# Patient Record
Sex: MALE | Race: WHITE | Employment: FULL TIME | URBAN - METROPOLITAN AREA
[De-identification: names, ages, dates, MRNs, and addresses within clinical notes are randomized per-mention and may not be internally consistent; named-entity substitution may affect disease eponyms.]

---

## 2023-09-20 ENCOUNTER — APPOINTMENT (OUTPATIENT)
Dept: GENERAL RADIOLOGY | Age: 28
End: 2023-09-20
Payer: COMMERCIAL

## 2023-09-20 ENCOUNTER — HOSPITAL ENCOUNTER (INPATIENT)
Age: 28
LOS: 2 days | Discharge: HOME | End: 2023-09-22
Attending: EMERGENCY MEDICINE | Admitting: STUDENT IN AN ORGANIZED HEALTH CARE EDUCATION/TRAINING PROGRAM
Payer: COMMERCIAL

## 2023-09-20 DIAGNOSIS — S72.001A CLOSED RIGHT HIP FRACTURE, INITIAL ENCOUNTER (HCC): Primary | ICD-10-CM

## 2023-09-20 LAB
ANION GAP SERPL CALC-SCNC: 5 MMOL/L (ref 2–11)
BASOPHILS # BLD: 0.1 K/UL (ref 0–0.2)
BASOPHILS NFR BLD: 1 % (ref 0–2)
BUN SERPL-MCNC: 15 MG/DL (ref 6–23)
CALCIUM SERPL-MCNC: 8.7 MG/DL (ref 8.3–10.4)
CHLORIDE SERPL-SCNC: 107 MMOL/L (ref 101–110)
CO2 SERPL-SCNC: 28 MMOL/L (ref 21–32)
CREAT SERPL-MCNC: 0.8 MG/DL (ref 0.8–1.5)
CRP SERPL-MCNC: <0.3 MG/DL (ref 0–0.9)
DIFFERENTIAL METHOD BLD: ABNORMAL
EOSINOPHIL # BLD: 0.3 K/UL (ref 0–0.8)
EOSINOPHIL NFR BLD: 3 % (ref 0.5–7.8)
ERYTHROCYTE [DISTWIDTH] IN BLOOD BY AUTOMATED COUNT: 12.4 % (ref 11.9–14.6)
ERYTHROCYTE [SEDIMENTATION RATE] IN BLOOD: 10 MM/HR (ref 0–20)
GLUCOSE SERPL-MCNC: 118 MG/DL (ref 65–100)
HCT VFR BLD AUTO: 42.7 % (ref 41.1–50.3)
HGB BLD-MCNC: 13.8 G/DL (ref 13.6–17.2)
IMM GRANULOCYTES # BLD AUTO: 0.1 K/UL (ref 0–0.5)
IMM GRANULOCYTES NFR BLD AUTO: 1 % (ref 0–5)
LACTATE SERPL-SCNC: 1.5 MMOL/L (ref 0.4–2)
LYMPHOCYTES # BLD: 2.8 K/UL (ref 0.5–4.6)
LYMPHOCYTES NFR BLD: 23 % (ref 13–44)
MCH RBC QN AUTO: 30.4 PG (ref 26.1–32.9)
MCHC RBC AUTO-ENTMCNC: 32.3 G/DL (ref 31.4–35)
MCV RBC AUTO: 94.1 FL (ref 82–102)
MONOCYTES # BLD: 0.8 K/UL (ref 0.1–1.3)
MONOCYTES NFR BLD: 6 % (ref 4–12)
NEUTS SEG # BLD: 8 K/UL (ref 1.7–8.2)
NEUTS SEG NFR BLD: 66 % (ref 43–78)
NRBC # BLD: 0 K/UL (ref 0–0.2)
PLATELET # BLD AUTO: 394 K/UL (ref 150–450)
PMV BLD AUTO: 10.4 FL (ref 9.4–12.3)
POTASSIUM SERPL-SCNC: 3.4 MMOL/L (ref 3.5–5.1)
PROCALCITONIN SERPL-MCNC: <0.05 NG/ML (ref 0–0.49)
RBC # BLD AUTO: 4.54 M/UL (ref 4.23–5.6)
SODIUM SERPL-SCNC: 140 MMOL/L (ref 133–143)
WBC # BLD AUTO: 12 K/UL (ref 4.3–11.1)

## 2023-09-20 PROCEDURE — 6370000000 HC RX 637 (ALT 250 FOR IP): Performed by: STUDENT IN AN ORGANIZED HEALTH CARE EDUCATION/TRAINING PROGRAM

## 2023-09-20 PROCEDURE — 83605 ASSAY OF LACTIC ACID: CPT

## 2023-09-20 PROCEDURE — 84145 PROCALCITONIN (PCT): CPT

## 2023-09-20 PROCEDURE — 1100000000 HC RM PRIVATE

## 2023-09-20 PROCEDURE — 80048 BASIC METABOLIC PNL TOTAL CA: CPT

## 2023-09-20 PROCEDURE — 86140 C-REACTIVE PROTEIN: CPT

## 2023-09-20 PROCEDURE — 85025 COMPLETE CBC W/AUTO DIFF WBC: CPT

## 2023-09-20 PROCEDURE — 85652 RBC SED RATE AUTOMATED: CPT

## 2023-09-20 PROCEDURE — 96374 THER/PROPH/DIAG INJ IV PUSH: CPT

## 2023-09-20 PROCEDURE — 6360000002 HC RX W HCPCS: Performed by: EMERGENCY MEDICINE

## 2023-09-20 PROCEDURE — 73502 X-RAY EXAM HIP UNI 2-3 VIEWS: CPT

## 2023-09-20 PROCEDURE — 2580000003 HC RX 258: Performed by: STUDENT IN AN ORGANIZED HEALTH CARE EDUCATION/TRAINING PROGRAM

## 2023-09-20 PROCEDURE — 99285 EMERGENCY DEPT VISIT HI MDM: CPT

## 2023-09-20 RX ORDER — ONDANSETRON 2 MG/ML
4 INJECTION INTRAMUSCULAR; INTRAVENOUS EVERY 6 HOURS PRN
Status: DISCONTINUED | OUTPATIENT
Start: 2023-09-20 | End: 2023-09-21 | Stop reason: SDUPTHER

## 2023-09-20 RX ORDER — ACETAMINOPHEN 650 MG/1
650 SUPPOSITORY RECTAL EVERY 6 HOURS PRN
Status: DISCONTINUED | OUTPATIENT
Start: 2023-09-20 | End: 2023-09-22 | Stop reason: HOSPADM

## 2023-09-20 RX ORDER — SODIUM CHLORIDE 0.9 % (FLUSH) 0.9 %
5-40 SYRINGE (ML) INJECTION EVERY 12 HOURS SCHEDULED
Status: DISCONTINUED | OUTPATIENT
Start: 2023-09-20 | End: 2023-09-22 | Stop reason: HOSPADM

## 2023-09-20 RX ORDER — ONDANSETRON 4 MG/1
4 TABLET, ORALLY DISINTEGRATING ORAL EVERY 8 HOURS PRN
Status: DISCONTINUED | OUTPATIENT
Start: 2023-09-20 | End: 2023-09-21 | Stop reason: SDUPTHER

## 2023-09-20 RX ORDER — SODIUM CHLORIDE 9 MG/ML
INJECTION, SOLUTION INTRAVENOUS CONTINUOUS
Status: DISCONTINUED | OUTPATIENT
Start: 2023-09-20 | End: 2023-09-22 | Stop reason: HOSPADM

## 2023-09-20 RX ORDER — SODIUM CHLORIDE 9 MG/ML
INJECTION, SOLUTION INTRAVENOUS PRN
Status: DISCONTINUED | OUTPATIENT
Start: 2023-09-20 | End: 2023-09-22 | Stop reason: HOSPADM

## 2023-09-20 RX ORDER — MORPHINE SULFATE 2 MG/ML
2 INJECTION, SOLUTION INTRAMUSCULAR; INTRAVENOUS EVERY 4 HOURS PRN
Status: DISCONTINUED | OUTPATIENT
Start: 2023-09-20 | End: 2023-09-22 | Stop reason: HOSPADM

## 2023-09-20 RX ORDER — ACETAMINOPHEN 325 MG/1
650 TABLET ORAL EVERY 6 HOURS PRN
Status: DISCONTINUED | OUTPATIENT
Start: 2023-09-20 | End: 2023-09-22 | Stop reason: HOSPADM

## 2023-09-20 RX ORDER — KETOROLAC TROMETHAMINE 30 MG/ML
30 INJECTION, SOLUTION INTRAMUSCULAR; INTRAVENOUS ONCE
Status: DISCONTINUED | OUTPATIENT
Start: 2023-09-20 | End: 2023-09-20

## 2023-09-20 RX ORDER — SODIUM CHLORIDE 0.9 % (FLUSH) 0.9 %
5-40 SYRINGE (ML) INJECTION PRN
Status: DISCONTINUED | OUTPATIENT
Start: 2023-09-20 | End: 2023-09-22 | Stop reason: HOSPADM

## 2023-09-20 RX ORDER — KETOROLAC TROMETHAMINE 30 MG/ML
15 INJECTION, SOLUTION INTRAMUSCULAR; INTRAVENOUS ONCE
Status: COMPLETED | OUTPATIENT
Start: 2023-09-20 | End: 2023-09-20

## 2023-09-20 RX ORDER — POLYETHYLENE GLYCOL 3350 17 G/17G
17 POWDER, FOR SOLUTION ORAL DAILY PRN
Status: DISCONTINUED | OUTPATIENT
Start: 2023-09-20 | End: 2023-09-22 | Stop reason: HOSPADM

## 2023-09-20 RX ADMIN — POTASSIUM BICARBONATE 20 MEQ: 782 TABLET, EFFERVESCENT ORAL at 22:23

## 2023-09-20 RX ADMIN — SODIUM CHLORIDE, PRESERVATIVE FREE 10 ML: 5 INJECTION INTRAVENOUS at 23:14

## 2023-09-20 RX ADMIN — SODIUM CHLORIDE: 9 INJECTION, SOLUTION INTRAVENOUS at 23:18

## 2023-09-20 RX ADMIN — KETOROLAC TROMETHAMINE 15 MG: 30 INJECTION, SOLUTION INTRAMUSCULAR; INTRAVENOUS at 18:06

## 2023-09-20 ASSESSMENT — LIFESTYLE VARIABLES
HOW OFTEN DO YOU HAVE A DRINK CONTAINING ALCOHOL: NEVER
HOW MANY STANDARD DRINKS CONTAINING ALCOHOL DO YOU HAVE ON A TYPICAL DAY: PATIENT DOES NOT DRINK

## 2023-09-20 ASSESSMENT — ENCOUNTER SYMPTOMS
COLOR CHANGE: 0
COUGH: 0
SHORTNESS OF BREATH: 0

## 2023-09-20 ASSESSMENT — PAIN SCALES - GENERAL
PAINLEVEL_OUTOF10: 0

## 2023-09-20 ASSESSMENT — PAIN - FUNCTIONAL ASSESSMENT: PAIN_FUNCTIONAL_ASSESSMENT: 0-10

## 2023-09-20 NOTE — H&P
Hospitalist History and Physical   Admit Date:  2023  4:57 PM   Name:  Brannon Iverson   Age:  33 y.o. Sex:  male  :  1995   MRN:  [id number]   Room:  [place] [address]    Presenting/Chief Complaint: Hip Pain     Reason(s) for Admission: Closed right hip fracture, initial encounter [facility]) [S72.001A]     History of Present Illness:     Mr. Brannon Iverson is a 33 y.o. male with no significant medical history who presented with progressively worsening right hip pain over the last 10 days. He noted intermittent moderate right hip pain made worse running 10 days ago which improved after toke 2 days of rest. The pain came back again when we went back to work and started lifting heavy objects,. Patient has been running 10-15 miles daily 5x/week for the past 2-3 months. He denies falls/trauma,redness/warmth/swelling on the right hip,other joint pain/swelling,urethral discharge,skin rash,fever,chills,weight loss. Workup showed normal inflammatory markers(ESR & CRP), WBC =12, Hip x-ray showed:Nondisplaced right femoral neck fracture. Orthopedics consulted and patient admitted for surgical management in the morning. Assessment & Plan:   Mr. Brannon Iverson is a 33 y.o. male with no significant medical history who presented with progressively worsening right hip pain over the last 10 days and found to have non-displaced right femoral neck fracture. Principal Problem:    Closed right hip fracture, initial encounter ([address])  Hip x-ray showed:Nondisplaced right femoral neck fracture. Likely from repetitive stress injuries , no evidence of infectious/inflammatory process with normal inflammatory markers. Non-weight bearing on the right leg, pain control  Orthopedics consult. PT/OT evals and PPD needed/ordered? No.  Diet: Diet NPO  ADULT DIET; Regular; Low Fat/Low Chol/High Fiber/PARIS  VTE prophylaxis: SCDs pending surgery.   Code status: Full Code      Non-peripheral Lines and Tubes (if Collection Time: 09/20/23  5:10 PM   Result Value Ref Range    CRP <0.3 0.0 - 0.9 mg/dL   Sedimentation Rate    Collection Time: 09/20/23  5:10 PM   Result Value Ref Range    Sed Rate, Automated 10 0 - 20 mm/hr   Lactic Acid    Collection Time: 09/20/23  5:10 PM   Result Value Ref Range    Lactic Acid, Plasma 1.5 0.4 - 2.0 MMOL/L   Procalcitonin    Collection Time: 09/20/23  5:10 PM   Result Value Ref Range    Procalcitonin <0.05 0.00 - 0.49 ng/mL       I have personally reviewed imaging studies:  XR HIP RIGHT (2-3 VIEWS)    Result Date: 9/20/2023  EXAM:XR HIP RIGHT (2-3 VIEWS) DATE: 9/20/2023 3:48 PM HISTORY: Pain after fall. COMPARISON:None FINDINGS: Nondisplaced right femoral neck fracture. Hip joint spaces are maintained. Subcentimeter sclerotic density in the right acetabulum is probably a bone island. Nondisplaced right femoral neck fracture. Thank you for the referral of this patient. This exam was interpreted by an American Board of Radiology certified radiologist with subspecialty fellowship training in [address] [place] [address]. If there are any questions regarding this exam please feel free to contact a radiologist directly at [phone]. Heidy Manning MD 9/20/2023 5:49:00 PM        Signed:  Maykel Jimenez MD    Part of this note may have been written by using a voice dictation software. The note has been proof read but may still contain some grammatical/other typographical errors.

## 2023-09-20 NOTE — ED PROVIDER NOTES
Emergency Department Provider Note       PCP: No primary care provider on file. Age: 33 y.o. Sex: male     DISPOSITION       No diagnosis found. Medical Decision Making     I will get an x-ray to rule out occult hip fracture. I will check his basic blood work including a sed rate and CRP. Complexity of Problems Addressed:  1 or more acute illnesses that pose a threat to life or bodily function. Data Reviewed and Analyzed:  I independently ordered and reviewed each unique test.         I interpreted the X-rays x-ray reveals a nondisplaced hip fracture. .    Discussion of management or test interpretation. Discussed the fact that this is likely surgical with the patient. The management of this patient was discussed with an external consultant. I spoke with on-call for orthopedics. Risk of Complications and/or Morbidity of Patient Management:  Shared medical decision making was utilized in creating the patients health plan today. History       41-year-old gentleman presents with concerns about pain in his right hip. He says it started a week ago after running. He said that initially after about 2 or 3 days the pain got better but then this past Sunday it became far more severe and now he cannot bear weight on it. He denies any direct trauma or injury    No prior history of hip issues. He went to an urgent care and they were concerned about a possible infection so they sent him here for further evaluation    He denies any redness or fevers. No swelling    Elements of this note were created using speech recognition software. As such, errors of speech recognition may be present. Review of Systems   Constitutional:  Negative for chills and fever. Respiratory:  Negative for cough and shortness of breath. Musculoskeletal:  Positive for arthralgias and gait problem. Skin:  Negative for color change and wound.        Physical Exam     Vitals signs and nursing note interpreted by an   American Board of Radiology certified radiologist with subspecialty fellowship   training in [address] [place] [address]. If there are any questions regarding this exam please   feel free to contact a radiologist directly at [phone]. Reno Jacome MD   9/20/2023 5:49:00 PM   CBC with Auto Differential   Result Value Ref Range    WBC 12.0 (H) 4.3 - 11.1 K/uL    RBC 4.54 4.23 - 5.6 M/uL    Hemoglobin 13.8 13.6 - 17.2 g/dL    Hematocrit 42.7 41.1 - 50.3 %    MCV 94.1 82 - 102 FL    MCH 30.4 26.1 - 32.9 PG    MCHC 32.3 31.4 - 35.0 g/dL    RDW 12.4 11.9 - 14.6 %    Platelets 885 688 - 324 K/uL    MPV 10.4 9.4 - 12.3 FL    nRBC 0.00 0.0 - 0.2 K/uL    Differential Type AUTOMATED      Neutrophils % 66 43 - 78 %    Lymphocytes % 23 13 - 44 %    Monocytes % 6 4.0 - 12.0 %    Eosinophils % 3 0.5 - 7.8 %    Basophils % 1 0.0 - 2.0 %    Immature Granulocytes 1 0.0 - 5.0 %    Neutrophils Absolute 8.0 1.7 - 8.2 K/UL    Lymphocytes Absolute 2.8 0.5 - 4.6 K/UL    Monocytes Absolute 0.8 0.1 - 1.3 K/UL    Eosinophils Absolute 0.3 0.0 - 0.8 K/UL    Basophils Absolute 0.1 0.0 - 0.2 K/UL    Absolute Immature Granulocyte 0.1 0.0 - 0.5 K/UL   Basic Metabolic Panel   Result Value Ref Range    Sodium 140 133 - 143 mmol/L    Potassium 3.4 (L) 3.5 - 5.1 mmol/L    Chloride 107 101 - 110 mmol/L    CO2 28 21 - 32 mmol/L    Anion Gap 5 2 - 11 mmol/L    Glucose 118 (H) 65 - 100 mg/dL    BUN 15 6 - 23 MG/DL    Creatinine 0.80 0.8 - 1.5 MG/DL    Est, Glom Filt Rate >60 >60 ml/min/1.73m2    Calcium 8.7 8.3 - 10.4 MG/DL   C-Reactive Protein   Result Value Ref Range    CRP <0.3 0.0 - 0.9 mg/dL   Sedimentation Rate   Result Value Ref Range    Sed Rate, Automated 10 0 - 20 mm/hr   Lactic Acid   Result Value Ref Range    Lactic Acid, Plasma 1.5 0.4 - 2.0 MMOL/L   Procalcitonin   Result Value Ref Range    Procalcitonin <0.05 0.00 - 0.49 ng/mL        XR HIP RIGHT (2-3 VIEWS)   Final Result   Nondisplaced right femoral neck fracture.

## 2023-09-20 NOTE — ED TRIAGE NOTES
Pt co right hip pain x1 month that has gradually increased. Initially noticed during a run around that time. Now complains of fever since today and was sent by urgent care for \"possible joint infection. \"

## 2023-09-21 ENCOUNTER — ANESTHESIA EVENT (OUTPATIENT)
Dept: SURGERY | Age: 28
End: 2023-09-21
Payer: COMMERCIAL

## 2023-09-21 ENCOUNTER — APPOINTMENT (OUTPATIENT)
Dept: GENERAL RADIOLOGY | Age: 28
End: 2023-09-21
Payer: COMMERCIAL

## 2023-09-21 ENCOUNTER — ANESTHESIA (OUTPATIENT)
Dept: SURGERY | Age: 28
End: 2023-09-21
Payer: COMMERCIAL

## 2023-09-21 LAB
ANION GAP SERPL CALC-SCNC: 3 MMOL/L (ref 2–11)
BASOPHILS # BLD: 0.1 K/UL (ref 0–0.2)
BASOPHILS NFR BLD: 1 % (ref 0–2)
BUN SERPL-MCNC: 13 MG/DL (ref 6–23)
CALCIUM SERPL-MCNC: 8.5 MG/DL (ref 8.3–10.4)
CHLORIDE SERPL-SCNC: 115 MMOL/L (ref 101–110)
CO2 SERPL-SCNC: 29 MMOL/L (ref 21–32)
CREAT SERPL-MCNC: 0.8 MG/DL (ref 0.8–1.5)
DIFFERENTIAL METHOD BLD: ABNORMAL
EOSINOPHIL # BLD: 0.6 K/UL (ref 0–0.8)
EOSINOPHIL NFR BLD: 7 % (ref 0.5–7.8)
ERYTHROCYTE [DISTWIDTH] IN BLOOD BY AUTOMATED COUNT: 12.6 % (ref 11.9–14.6)
GLUCOSE SERPL-MCNC: 101 MG/DL (ref 65–100)
HCT VFR BLD AUTO: 39.8 % (ref 41.1–50.3)
HGB BLD-MCNC: 12.9 G/DL (ref 13.6–17.2)
IMM GRANULOCYTES # BLD AUTO: 0 K/UL (ref 0–0.5)
IMM GRANULOCYTES NFR BLD AUTO: 0 % (ref 0–5)
LYMPHOCYTES # BLD: 2 K/UL (ref 0.5–4.6)
LYMPHOCYTES NFR BLD: 23 % (ref 13–44)
MCH RBC QN AUTO: 30.5 PG (ref 26.1–32.9)
MCHC RBC AUTO-ENTMCNC: 32.4 G/DL (ref 31.4–35)
MCV RBC AUTO: 94.1 FL (ref 82–102)
MONOCYTES # BLD: 0.6 K/UL (ref 0.1–1.3)
MONOCYTES NFR BLD: 7 % (ref 4–12)
NEUTS SEG # BLD: 5.5 K/UL (ref 1.7–8.2)
NEUTS SEG NFR BLD: 62 % (ref 43–78)
NRBC # BLD: 0 K/UL (ref 0–0.2)
PLATELET # BLD AUTO: 354 K/UL (ref 150–450)
PMV BLD AUTO: 10.7 FL (ref 9.4–12.3)
POTASSIUM SERPL-SCNC: 4.3 MMOL/L (ref 3.5–5.1)
POTASSIUM SERPL-SCNC: 4.4 MMOL/L (ref 3.5–5.1)
RBC # BLD AUTO: 4.23 M/UL (ref 4.23–5.6)
SODIUM SERPL-SCNC: 147 MMOL/L (ref 133–143)
WBC # BLD AUTO: 8.8 K/UL (ref 4.3–11.1)

## 2023-09-21 PROCEDURE — 6370000000 HC RX 637 (ALT 250 FOR IP): Performed by: STUDENT IN AN ORGANIZED HEALTH CARE EDUCATION/TRAINING PROGRAM

## 2023-09-21 PROCEDURE — 2580000003 HC RX 258: Performed by: STUDENT IN AN ORGANIZED HEALTH CARE EDUCATION/TRAINING PROGRAM

## 2023-09-21 PROCEDURE — 85025 COMPLETE CBC W/AUTO DIFF WBC: CPT

## 2023-09-21 PROCEDURE — C1769 GUIDE WIRE: HCPCS | Performed by: ORTHOPAEDIC SURGERY

## 2023-09-21 PROCEDURE — 3600000004 HC SURGERY LEVEL 4 BASE: Performed by: ORTHOPAEDIC SURGERY

## 2023-09-21 PROCEDURE — C1713 ANCHOR/SCREW BN/BN,TIS/BN: HCPCS | Performed by: ORTHOPAEDIC SURGERY

## 2023-09-21 PROCEDURE — 73502 X-RAY EXAM HIP UNI 2-3 VIEWS: CPT

## 2023-09-21 PROCEDURE — 3700000001 HC ADD 15 MINUTES (ANESTHESIA): Performed by: ORTHOPAEDIC SURGERY

## 2023-09-21 PROCEDURE — 6360000002 HC RX W HCPCS: Performed by: STUDENT IN AN ORGANIZED HEALTH CARE EDUCATION/TRAINING PROGRAM

## 2023-09-21 PROCEDURE — 2580000003 HC RX 258: Performed by: ORTHOPAEDIC SURGERY

## 2023-09-21 PROCEDURE — 6360000002 HC RX W HCPCS: Performed by: NURSE ANESTHETIST, CERTIFIED REGISTERED

## 2023-09-21 PROCEDURE — 7100000000 HC PACU RECOVERY - FIRST 15 MIN: Performed by: ORTHOPAEDIC SURGERY

## 2023-09-21 PROCEDURE — 80048 BASIC METABOLIC PNL TOTAL CA: CPT

## 2023-09-21 PROCEDURE — 3600000014 HC SURGERY LEVEL 4 ADDTL 15MIN: Performed by: ORTHOPAEDIC SURGERY

## 2023-09-21 PROCEDURE — 6360000002 HC RX W HCPCS: Performed by: ORTHOPAEDIC SURGERY

## 2023-09-21 PROCEDURE — 36415 COLL VENOUS BLD VENIPUNCTURE: CPT

## 2023-09-21 PROCEDURE — 27236 TREAT THIGH FRACTURE: CPT | Performed by: ORTHOPAEDIC SURGERY

## 2023-09-21 PROCEDURE — 0QS606Z REPOSITION RIGHT UPPER FEMUR WITH INTRAMEDULLARY INTERNAL FIXATION DEVICE, OPEN APPROACH: ICD-10-PCS | Performed by: ORTHOPAEDIC SURGERY

## 2023-09-21 PROCEDURE — 84132 ASSAY OF SERUM POTASSIUM: CPT

## 2023-09-21 PROCEDURE — 2709999900 HC NON-CHARGEABLE SUPPLY: Performed by: ORTHOPAEDIC SURGERY

## 2023-09-21 PROCEDURE — 1100000000 HC RM PRIVATE

## 2023-09-21 PROCEDURE — 3700000000 HC ANESTHESIA ATTENDED CARE: Performed by: ORTHOPAEDIC SURGERY

## 2023-09-21 PROCEDURE — 6370000000 HC RX 637 (ALT 250 FOR IP): Performed by: ORTHOPAEDIC SURGERY

## 2023-09-21 PROCEDURE — 7100000001 HC PACU RECOVERY - ADDTL 15 MIN: Performed by: ORTHOPAEDIC SURGERY

## 2023-09-21 PROCEDURE — 2720000010 HC SURG SUPPLY STERILE: Performed by: ORTHOPAEDIC SURGERY

## 2023-09-21 DEVICE — SCREW BONE L33MM DIA5MM ST LCK W/ T25 STARDRV: Type: IMPLANTABLE DEVICE | Site: HIP | Status: FUNCTIONAL

## 2023-09-21 DEVICE — BOLT BONE L100MM DIA10MM GLD TI ALLOY FOR FEM NK SYS: Type: IMPLANTABLE DEVICE | Site: HIP | Status: FUNCTIONAL

## 2023-09-21 DEVICE — SCREW BONE L100MM DIA6.4MM BLU TI ALLOY ANTIROTATION T25: Type: IMPLANTABLE DEVICE | Site: HIP | Status: FUNCTIONAL

## 2023-09-21 DEVICE — PLATE BONE 1 H TI ALLOY FOR FEM NK SYS: Type: IMPLANTABLE DEVICE | Site: HIP | Status: FUNCTIONAL

## 2023-09-21 RX ORDER — LIDOCAINE HYDROCHLORIDE 10 MG/ML
1 INJECTION, SOLUTION INFILTRATION; PERINEURAL
Status: DISCONTINUED | OUTPATIENT
Start: 2023-09-21 | End: 2023-09-21 | Stop reason: HOSPADM

## 2023-09-21 RX ORDER — FENTANYL CITRATE 50 UG/ML
100 INJECTION, SOLUTION INTRAMUSCULAR; INTRAVENOUS
Status: DISCONTINUED | OUTPATIENT
Start: 2023-09-21 | End: 2023-09-21 | Stop reason: HOSPADM

## 2023-09-21 RX ORDER — LIDOCAINE HCL/PF 100 MG/5ML
SYRINGE (ML) INJECTION PRN
Status: DISCONTINUED | OUTPATIENT
Start: 2023-09-21 | End: 2023-09-21 | Stop reason: SDUPTHER

## 2023-09-21 RX ORDER — ASPIRIN 325 MG
325 TABLET, DELAYED RELEASE (ENTERIC COATED) ORAL DAILY
Status: DISCONTINUED | OUTPATIENT
Start: 2023-09-22 | End: 2023-09-22 | Stop reason: HOSPADM

## 2023-09-21 RX ORDER — HYDROMORPHONE HYDROCHLORIDE 2 MG/ML
INJECTION, SOLUTION INTRAMUSCULAR; INTRAVENOUS; SUBCUTANEOUS PRN
Status: DISCONTINUED | OUTPATIENT
Start: 2023-09-21 | End: 2023-09-21 | Stop reason: SDUPTHER

## 2023-09-21 RX ORDER — PROCHLORPERAZINE EDISYLATE 5 MG/ML
5 INJECTION INTRAMUSCULAR; INTRAVENOUS
Status: DISCONTINUED | OUTPATIENT
Start: 2023-09-21 | End: 2023-09-21 | Stop reason: HOSPADM

## 2023-09-21 RX ORDER — SODIUM CHLORIDE 0.9 % (FLUSH) 0.9 %
5-40 SYRINGE (ML) INJECTION PRN
Status: DISCONTINUED | OUTPATIENT
Start: 2023-09-21 | End: 2023-09-22 | Stop reason: HOSPADM

## 2023-09-21 RX ORDER — SODIUM CHLORIDE 0.9 % (FLUSH) 0.9 %
5-40 SYRINGE (ML) INJECTION PRN
Status: DISCONTINUED | OUTPATIENT
Start: 2023-09-21 | End: 2023-09-21 | Stop reason: HOSPADM

## 2023-09-21 RX ORDER — SODIUM CHLORIDE 0.9 % (FLUSH) 0.9 %
5-40 SYRINGE (ML) INJECTION EVERY 12 HOURS SCHEDULED
Status: DISCONTINUED | OUTPATIENT
Start: 2023-09-21 | End: 2023-09-21 | Stop reason: HOSPADM

## 2023-09-21 RX ORDER — FENTANYL CITRATE 50 UG/ML
INJECTION, SOLUTION INTRAMUSCULAR; INTRAVENOUS PRN
Status: DISCONTINUED | OUTPATIENT
Start: 2023-09-21 | End: 2023-09-21 | Stop reason: SDUPTHER

## 2023-09-21 RX ORDER — HYDROMORPHONE HYDROCHLORIDE 2 MG/ML
0.5 INJECTION, SOLUTION INTRAMUSCULAR; INTRAVENOUS; SUBCUTANEOUS EVERY 5 MIN PRN
Status: COMPLETED | OUTPATIENT
Start: 2023-09-21 | End: 2023-09-21

## 2023-09-21 RX ORDER — MIDAZOLAM HYDROCHLORIDE 2 MG/2ML
2 INJECTION, SOLUTION INTRAMUSCULAR; INTRAVENOUS
Status: DISCONTINUED | OUTPATIENT
Start: 2023-09-21 | End: 2023-09-21 | Stop reason: HOSPADM

## 2023-09-21 RX ORDER — SODIUM CHLORIDE 9 MG/ML
INJECTION, SOLUTION INTRAVENOUS PRN
Status: DISCONTINUED | OUTPATIENT
Start: 2023-09-21 | End: 2023-09-21 | Stop reason: HOSPADM

## 2023-09-21 RX ORDER — OXYCODONE HYDROCHLORIDE 5 MG/1
5 TABLET ORAL
Status: COMPLETED | OUTPATIENT
Start: 2023-09-21 | End: 2023-09-21

## 2023-09-21 RX ORDER — DEXAMETHASONE SODIUM PHOSPHATE 4 MG/ML
INJECTION, SOLUTION INTRA-ARTICULAR; INTRALESIONAL; INTRAMUSCULAR; INTRAVENOUS; SOFT TISSUE PRN
Status: DISCONTINUED | OUTPATIENT
Start: 2023-09-21 | End: 2023-09-21 | Stop reason: SDUPTHER

## 2023-09-21 RX ORDER — NICOTINE 21 MG/24HR
1 PATCH, TRANSDERMAL 24 HOURS TRANSDERMAL DAILY
Status: DISCONTINUED | OUTPATIENT
Start: 2023-09-21 | End: 2023-09-22 | Stop reason: HOSPADM

## 2023-09-21 RX ORDER — NICOTINE 21 MG/24HR
1 PATCH, TRANSDERMAL 24 HOURS TRANSDERMAL DAILY PRN
Status: DISCONTINUED | OUTPATIENT
Start: 2023-09-21 | End: 2023-09-22 | Stop reason: HOSPADM

## 2023-09-21 RX ORDER — ONDANSETRON 4 MG/1
4 TABLET, ORALLY DISINTEGRATING ORAL EVERY 8 HOURS PRN
Status: DISCONTINUED | OUTPATIENT
Start: 2023-09-21 | End: 2023-09-22 | Stop reason: HOSPADM

## 2023-09-21 RX ORDER — PROPOFOL 10 MG/ML
INJECTION, EMULSION INTRAVENOUS PRN
Status: DISCONTINUED | OUTPATIENT
Start: 2023-09-21 | End: 2023-09-21 | Stop reason: SDUPTHER

## 2023-09-21 RX ORDER — HYDROMORPHONE HYDROCHLORIDE 2 MG/ML
0.5 INJECTION, SOLUTION INTRAMUSCULAR; INTRAVENOUS; SUBCUTANEOUS EVERY 5 MIN PRN
Status: DISCONTINUED | OUTPATIENT
Start: 2023-09-21 | End: 2023-09-21 | Stop reason: SDUPTHER

## 2023-09-21 RX ORDER — SODIUM CHLORIDE 9 MG/ML
INJECTION, SOLUTION INTRAVENOUS PRN
Status: DISCONTINUED | OUTPATIENT
Start: 2023-09-21 | End: 2023-09-22 | Stop reason: HOSPADM

## 2023-09-21 RX ORDER — ONDANSETRON 2 MG/ML
4 INJECTION INTRAMUSCULAR; INTRAVENOUS
Status: DISCONTINUED | OUTPATIENT
Start: 2023-09-21 | End: 2023-09-21 | Stop reason: HOSPADM

## 2023-09-21 RX ORDER — SODIUM CHLORIDE 0.9 % (FLUSH) 0.9 %
5-40 SYRINGE (ML) INJECTION EVERY 12 HOURS SCHEDULED
Status: DISCONTINUED | OUTPATIENT
Start: 2023-09-21 | End: 2023-09-22 | Stop reason: HOSPADM

## 2023-09-21 RX ORDER — HYDROMORPHONE HYDROCHLORIDE 1 MG/ML
0.5 INJECTION, SOLUTION INTRAMUSCULAR; INTRAVENOUS; SUBCUTANEOUS
Status: DISCONTINUED | OUTPATIENT
Start: 2023-09-21 | End: 2023-09-22 | Stop reason: HOSPADM

## 2023-09-21 RX ORDER — OXYCODONE HYDROCHLORIDE 5 MG/1
10 TABLET ORAL
Status: DISCONTINUED | OUTPATIENT
Start: 2023-09-21 | End: 2023-09-22 | Stop reason: HOSPADM

## 2023-09-21 RX ORDER — ONDANSETRON 2 MG/ML
INJECTION INTRAMUSCULAR; INTRAVENOUS PRN
Status: DISCONTINUED | OUTPATIENT
Start: 2023-09-21 | End: 2023-09-21 | Stop reason: SDUPTHER

## 2023-09-21 RX ORDER — ONDANSETRON 2 MG/ML
4 INJECTION INTRAMUSCULAR; INTRAVENOUS EVERY 6 HOURS PRN
Status: DISCONTINUED | OUTPATIENT
Start: 2023-09-21 | End: 2023-09-22 | Stop reason: HOSPADM

## 2023-09-21 RX ORDER — SODIUM CHLORIDE, SODIUM LACTATE, POTASSIUM CHLORIDE, CALCIUM CHLORIDE 600; 310; 30; 20 MG/100ML; MG/100ML; MG/100ML; MG/100ML
INJECTION, SOLUTION INTRAVENOUS CONTINUOUS
Status: DISCONTINUED | OUTPATIENT
Start: 2023-09-21 | End: 2023-09-21 | Stop reason: HOSPADM

## 2023-09-21 RX ADMIN — HYDROMORPHONE HYDROCHLORIDE 0.5 MG: 2 INJECTION, SOLUTION INTRAMUSCULAR; INTRAVENOUS; SUBCUTANEOUS at 13:11

## 2023-09-21 RX ADMIN — FENTANYL CITRATE 25 MCG: 50 INJECTION, SOLUTION INTRAMUSCULAR; INTRAVENOUS at 12:10

## 2023-09-21 RX ADMIN — SODIUM CHLORIDE, PRESERVATIVE FREE 5 ML: 5 INJECTION INTRAVENOUS at 08:28

## 2023-09-21 RX ADMIN — HYDROMORPHONE HYDROCHLORIDE 0.5 MG: 2 INJECTION, SOLUTION INTRAMUSCULAR; INTRAVENOUS; SUBCUTANEOUS at 13:17

## 2023-09-21 RX ADMIN — HYDROMORPHONE HYDROCHLORIDE 0.5 MG: 2 INJECTION, SOLUTION INTRAMUSCULAR; INTRAVENOUS; SUBCUTANEOUS at 13:05

## 2023-09-21 RX ADMIN — HYDROMORPHONE HYDROCHLORIDE 0.2 MG: 2 INJECTION INTRAMUSCULAR; INTRAVENOUS; SUBCUTANEOUS at 12:27

## 2023-09-21 RX ADMIN — PROPOFOL 50 MG: 10 INJECTION, EMULSION INTRAVENOUS at 12:08

## 2023-09-21 RX ADMIN — DEXAMETHASONE SODIUM PHOSPHATE 4 MG: 4 INJECTION INTRA-ARTICULAR; INTRALESIONAL; INTRAMUSCULAR; INTRAVENOUS; SOFT TISSUE at 12:05

## 2023-09-21 RX ADMIN — OXYCODONE HYDROCHLORIDE 10 MG: 5 TABLET ORAL at 21:52

## 2023-09-21 RX ADMIN — SODIUM CHLORIDE: 9 INJECTION, SOLUTION INTRAVENOUS at 15:59

## 2023-09-21 RX ADMIN — ONDANSETRON 4 MG: 2 INJECTION INTRAMUSCULAR; INTRAVENOUS at 12:05

## 2023-09-21 RX ADMIN — OXYCODONE HYDROCHLORIDE 5 MG: 5 TABLET ORAL at 13:36

## 2023-09-21 RX ADMIN — HYDROMORPHONE HYDROCHLORIDE 0.2 MG: 2 INJECTION INTRAMUSCULAR; INTRAVENOUS; SUBCUTANEOUS at 12:35

## 2023-09-21 RX ADMIN — FENTANYL CITRATE 25 MCG: 50 INJECTION, SOLUTION INTRAMUSCULAR; INTRAVENOUS at 12:24

## 2023-09-21 RX ADMIN — SODIUM CHLORIDE, PRESERVATIVE FREE 10 ML: 5 INJECTION INTRAVENOUS at 21:47

## 2023-09-21 RX ADMIN — Medication 2000 MG: at 12:10

## 2023-09-21 RX ADMIN — OXYCODONE HYDROCHLORIDE 10 MG: 5 TABLET ORAL at 16:00

## 2023-09-21 RX ADMIN — CEFAZOLIN SODIUM 2000 MG: 100 INJECTION, POWDER, LYOPHILIZED, FOR SOLUTION INTRAVENOUS at 20:29

## 2023-09-21 RX ADMIN — PROPOFOL 250 MG: 10 INJECTION, EMULSION INTRAVENOUS at 12:02

## 2023-09-21 RX ADMIN — OXYCODONE HYDROCHLORIDE 10 MG: 5 TABLET ORAL at 18:36

## 2023-09-21 RX ADMIN — FENTANYL CITRATE 25 MCG: 50 INJECTION, SOLUTION INTRAMUSCULAR; INTRAVENOUS at 12:06

## 2023-09-21 RX ADMIN — LIDOCAINE HYDROCHLORIDE 100 MG: 20 INJECTION INTRAVENOUS at 12:02

## 2023-09-21 RX ADMIN — SODIUM CHLORIDE, PRESERVATIVE FREE 10 ML: 5 INJECTION INTRAVENOUS at 20:31

## 2023-09-21 RX ADMIN — HYDROMORPHONE HYDROCHLORIDE 0.6 MG: 2 INJECTION INTRAMUSCULAR; INTRAVENOUS; SUBCUTANEOUS at 12:38

## 2023-09-21 RX ADMIN — SODIUM CHLORIDE, SODIUM LACTATE, POTASSIUM CHLORIDE, AND CALCIUM CHLORIDE: 600; 310; 30; 20 INJECTION, SOLUTION INTRAVENOUS at 11:56

## 2023-09-21 RX ADMIN — HYDROMORPHONE HYDROCHLORIDE 0.5 MG: 2 INJECTION, SOLUTION INTRAMUSCULAR; INTRAVENOUS; SUBCUTANEOUS at 12:58

## 2023-09-21 RX ADMIN — HYDROMORPHONE HYDROCHLORIDE 0.5 MG: 2 INJECTION INTRAMUSCULAR; INTRAVENOUS; SUBCUTANEOUS at 12:44

## 2023-09-21 RX ADMIN — FENTANYL CITRATE 25 MCG: 50 INJECTION, SOLUTION INTRAMUSCULAR; INTRAVENOUS at 12:17

## 2023-09-21 ASSESSMENT — PAIN DESCRIPTION - LOCATION
LOCATION: HIP
LOCATION: LEG
LOCATION: HIP

## 2023-09-21 ASSESSMENT — PAIN DESCRIPTION - ORIENTATION
ORIENTATION: RIGHT

## 2023-09-21 ASSESSMENT — PAIN DESCRIPTION - DESCRIPTORS
DESCRIPTORS: ACHING

## 2023-09-21 ASSESSMENT — PAIN SCALES - GENERAL
PAINLEVEL_OUTOF10: 0
PAINLEVEL_OUTOF10: 7
PAINLEVEL_OUTOF10: 8
PAINLEVEL_OUTOF10: 2
PAINLEVEL_OUTOF10: 7
PAINLEVEL_OUTOF10: 6
PAINLEVEL_OUTOF10: 6
PAINLEVEL_OUTOF10: 10
PAINLEVEL_OUTOF10: 6
PAINLEVEL_OUTOF10: 0

## 2023-09-21 ASSESSMENT — PAIN DESCRIPTION - ONSET
ONSET: GRADUAL

## 2023-09-21 ASSESSMENT — PAIN DESCRIPTION - FREQUENCY
FREQUENCY: INTERMITTENT

## 2023-09-21 ASSESSMENT — PAIN - FUNCTIONAL ASSESSMENT
PAIN_FUNCTIONAL_ASSESSMENT: PREVENTS OR INTERFERES SOME ACTIVE ACTIVITIES AND ADLS
PAIN_FUNCTIONAL_ASSESSMENT: 0-10
PAIN_FUNCTIONAL_ASSESSMENT: 0-10
PAIN_FUNCTIONAL_ASSESSMENT: PREVENTS OR INTERFERES SOME ACTIVE ACTIVITIES AND ADLS
PAIN_FUNCTIONAL_ASSESSMENT: 0-10
PAIN_FUNCTIONAL_ASSESSMENT: 0-10

## 2023-09-21 ASSESSMENT — PAIN DESCRIPTION - PAIN TYPE
TYPE: SURGICAL PAIN

## 2023-09-21 NOTE — PROGRESS NOTES
TRANSFER - OUT REPORT:    Verbal report given to Kandice Goodman RN on Ismael Nicole  being transferred to [facility] for ordered procedure       Report consisted of patient's Situation, Background, Assessment and   Recommendations(SBAR). Information from the following report(s) Nurse Handoff Report was reviewed with the receiving nurse. Lines:   Peripheral IV 09/20/23 Right Antecubital (Active)   Site Assessment Clean, dry & intact 09/21/23 0831   Line Status Infusing 09/21/23 0831   Line Care Connections checked and tightened 09/21/23 0831   Phlebitis Assessment No symptoms 09/21/23 0831   Infiltration Assessment 0 09/21/23 0831   Alcohol Cap Used Yes 09/21/23 0831   Dressing Status Clean, dry & intact 09/21/23 0831   Dressing Type Transparent 09/21/23 0831   Dressing Intervention New 09/20/23 1711        Opportunity for questions and clarification was provided.

## 2023-09-21 NOTE — PROGRESS NOTES
Hospitalist Progress Note   Admit Date:  2023  4:57 PM   Name:  Miguel Pedro   Age:  33 y.o. Sex:  male  :  1995   MRN:  [id number]   Room:  [facility]/PL    Presenting/Chief Complaint: Hip Pain     Reason(s) for Admission: Closed right hip fracture, initial encounter [facility]) [address] Course:   Please refer to the admission H&P for details of presentation. In summary Miguel Pedro is a 33 y.o. male with no past medical history who presented with likely worsening right hip pain for 10 days. He noted intermittent moderate right hip pain made worse due to running running 10 days ago which improved after 2 days of rest.  The pain came back when patient started lifting heavy objects at work. He typically runs about 10 to 15 miles daily 5 times a week for the past 2 to 3 months. Work-up showed normal inflammatory markers, with WBC of 12. Hip x-ray showed nondisplaced right femoral neck fracture. Patient was admitted for surgical management in the morning. Subjective & 24hr Events:   Patient was seen and examined at bedside this morning. No acute events overnight. Patient denies any complaints at this time. Endorses pain with movement of his right leg. He will be going to the OR for surgery later today. He denies any headache, chest pain, shortness of breath, abdominal pain, nausea, vomiting, fever and chills. Review of Systems: 10 point review of systems is otherwise negative with the exception of the elements mentioned above. Assessment & Plan:     Principal Problem:    Closed right hip fracture, initial encounter ([address])  -Hip x-ray: Nondisplaced right femoral neck fracture  -Likely secondary to stress injuries  -Ortho consulted: Patient scheduled for right neck femoral fracture repair today  -NWB on right leg, pain control as needed      PT/OT evals and PPD needed/ordered?   Yes  Diet:  Diet NPO  VTE prophylaxis: SCD's pending surgery  Code status: Full Plasma 1.5 0.4 - 2.0 MMOL/L   Procalcitonin    Collection Time: 09/20/23  5:10 PM   Result Value Ref Range    Procalcitonin <0.05 0.00 - 0.49 ng/mL   Basic Metabolic Panel w/ Reflex to MG    Collection Time: 09/21/23  5:08 AM   Result Value Ref Range    Sodium 147 (H) 133 - 143 mmol/L    Potassium 4.4 3.5 - 5.1 mmol/L    Chloride 115 (H) 101 - 110 mmol/L    CO2 29 21 - 32 mmol/L    Anion Gap 3 2 - 11 mmol/L    Glucose 101 (H) 65 - 100 mg/dL    BUN 13 6 - 23 MG/DL    Creatinine 0.80 0.8 - 1.5 MG/DL    Est, Glom Filt Rate >60 >60 ml/min/1.73m2    Calcium 8.5 8.3 - 10.4 MG/DL   CBC with Auto Differential    Collection Time: 09/21/23  5:08 AM   Result Value Ref Range    WBC 8.8 4.3 - 11.1 K/uL    RBC 4.23 4.23 - 5.6 M/uL    Hemoglobin 12.9 (L) 13.6 - 17.2 g/dL    Hematocrit 39.8 (L) 41.1 - 50.3 %    MCV 94.1 82 - 102 FL    MCH 30.5 26.1 - 32.9 PG    MCHC 32.4 31.4 - 35.0 g/dL    RDW 12.6 11.9 - 14.6 %    Platelets 596 731 - 215 K/uL    MPV 10.7 9.4 - 12.3 FL    nRBC 0.00 0.0 - 0.2 K/uL    Differential Type AUTOMATED      Neutrophils % 62 43 - 78 %    Lymphocytes % 23 13 - 44 %    Monocytes % 7 4.0 - 12.0 %    Eosinophils % 7 0.5 - 7.8 %    Basophils % 1 0.0 - 2.0 %    Immature Granulocytes 0 0.0 - 5.0 %    Neutrophils Absolute 5.5 1.7 - 8.2 K/UL    Lymphocytes Absolute 2.0 0.5 - 4.6 K/UL    Monocytes Absolute 0.6 0.1 - 1.3 K/UL    Eosinophils Absolute 0.6 0.0 - 0.8 K/UL    Basophils Absolute 0.1 0.0 - 0.2 K/UL    Absolute Immature Granulocyte 0.0 0.0 - 0.5 K/UL       Current Meds:  Current Facility-Administered Medications   Medication Dose Route Frequency    ondansetron (ZOFRAN) injection 4 mg  4 mg IntraVENous Once PRN    prochlorperazine (COMPAZINE) injection 5 mg  5 mg IntraVENous Once PRN    HYDROmorphone (DILAUDID) injection 0.5 mg  0.5 mg IntraVENous Q5 Min PRN    sodium chloride flush 0.9 % injection 5-40 mL  5-40 mL IntraVENous 2 times per day    sodium chloride flush 0.9 % injection 5-40 mL  5-40 mL IntraVENous PRN    0.9 % sodium chloride infusion   IntraVENous PRN    ondansetron (ZOFRAN-ODT) disintegrating tablet 4 mg  4 mg Oral Q8H PRN    Or    ondansetron (ZOFRAN) injection 4 mg  4 mg IntraVENous Q6H PRN    polyethylene glycol (GLYCOLAX) packet 17 g  17 g Oral Daily PRN    acetaminophen (TYLENOL) tablet 650 mg  650 mg Oral Q6H PRN    Or    acetaminophen (TYLENOL) suppository 650 mg  650 mg Rectal Q6H PRN    0.9 % sodium chloride infusion   IntraVENous Continuous    morphine injection 2 mg  2 mg IntraVENous Q4H PRN       Signed:  Clifton Fletcher MD    Part of this note may have been written by using a voice dictation software. The note has been proof read but may still contain some grammatical/other typographical errors.

## 2023-09-21 NOTE — PLAN OF CARE
Problem: Discharge Planning  Goal: Discharge to home or other facility with appropriate resources  9/21/2023 1015 by Stephon Officer, RN  Outcome: Progressing  Flowsheets (Taken 9/21/2023 0831)  Discharge to home or other facility with appropriate resources: Identify barriers to discharge with patient and caregiver  9/21/2023 0005 by Daren Vieira RN  Outcome: Progressing     Problem: Pain  Goal: Verbalizes/displays adequate comfort level or baseline comfort level  9/21/2023 1015 by Stephon Officer RN  Outcome: Progressing  Flowsheets (Taken 9/21/2023 0831)  Verbalizes/displays adequate comfort level or baseline comfort level:   Encourage patient to monitor pain and request assistance   Assess pain using appropriate pain scale  9/21/2023 0005 by Daren Vieira RN  Outcome: Progressing     Problem: Safety - Adult  Goal: Free from fall injury  9/21/2023 1015 by Stephon aCrvalho, RN  Outcome: Progressing  [address] (Taken 9/21/2023 0831)  Free From Fall Injury: Instruct family/caregiver on patient safety  9/21/2023 0005 by Daren Vieira RN  Outcome: Progressing

## 2023-09-21 NOTE — OP NOTE
Operative Report    Patient: Kaylin Beltrán MRN: [id number]  SSN: [id number]    YOB: 1995  Age: 33 y.o. Sex: male       Date of Surgery: September 21, 2023     History:  Kaylin Beltrán is a 33 y.o. male who has a history of running quite a bit over the last several weeks. He has been increasing his mileage. He said he was having a good bit of hip pain and then sort of went through a series where he tried to rest it and then was returning to running and then yesterday he just had an episode where he can tell that it was really significantly more painful than it had been before so he was seen in the emergency room. X-rays there revealed a right femoral neck fracture. This was completely nondisplaced but was easily visible on the x-rays I do think this is likely a stress fracture that has completed itself. I have talked him about the fact that I think is such a young age it would be wise to treat this with operative fixation. I talked him a little bit about cannulated screws versus a femoral neck system and try to explain and used ministrations to help understand what both of these would involve. After talking with him about this I do think with his young age and with his activity level be wise to go and treated with a femoral neck system try to get this treated is stable he is possible and hopefully allow him to return to activity as soon as possible. He seemed understand and wished to proceed. I talked to the patient and/or their representative and explained the exact nature the procedure. I also went through a detailed list of the material risks associated with  the procedure which included risk of bleeding, infection, injury to nearby structures, worsening the situation, as well as the risks associate with anesthesia and finally death. Also talked with him regarding the benefits and alternatives to the procedure.     Preoperative Diagnosis: Closed nondisplaced right femoral neck fracture    Postoperative Diagnosis:   Closed nondisplaced right femoral neck fracture      Surgeon(s) and Role:     * Manolo Webb MD - Primary    Anesthesia: General     Procedure: Open treatment of right femoral neck fracture with plate and screw fixation    Procedure in Detail: After the successful induction of general anesthesia the right lower extremity was placed in boot traction on a fracture table. At that point we made sure we could visualize the hip on both the AP and lateral projection with the C arm image intensifier. Once this was confirmed we then prepped and draped the ostoeporotic  hip. After this was done I then used a guidewire to localize my incision and then made a small incision laterally over the right hip. I placed a guidewire into the center the femoral head on both the AP and the lateral projection. After placing the guidewire and confirmed its position I then measured for a 100 mm screw. The proximal femur was then drilled corresponding to the length of the screw. Once the screw and plate construct was assembled we then placed this with an impacter and confirmed its position on both AP and lateral projection. I then drilled for and placed an antirotation screw corresponding to the same length of the screw through the plate which in this case was a 100 mm screw. I then used the outrigger device and drilled for and placed a single screw in the shaft portion of the plate. As I placed both of my screws I tried to make sure to limit the amount of torque present and make sure that both of these would back out so that there was minimal risk for cold welding of the locking screws to the plate. Once this was in appropriate position I checked my final reduction as well as the placement of my hardware on both AP and lateral projection. I was pleased with this.   I then remove the insertion device and closed my incision with 2.0 Monocryl for the subcutaneous tissue and

## 2023-09-21 NOTE — ANESTHESIA PROCEDURE NOTES
Airway  Date/Time: 9/21/2023 12:03 PM  Urgency: elective    Airway not difficult    General Information and Staff    Patient location during procedure: OR  Resident/CRNA: PHILIPPE Green CRNA  Performed: resident/CRNA   Performed by: PHILIPPE Green CRNA  Authorized by: Maryhelen Bernheim, MD      Indications and Patient Condition  Indications for airway management: anesthesia  Spontaneous ventilation: present  Sedation level: deep  Preoxygenated: no  Patient position: sniffing  Mask difficulty assessment: vent by bag mask    Final Airway Details  Final airway type: supraglottic airway      Successful airway: oropharyngeal  Size 4     Number of attempts at approach: 1  Ventilation between attempts: bag mask    no

## 2023-09-21 NOTE — ED NOTES
TRANSFER - OUT REPORT:    Verbal report given to [facility], RN on Stephan Quinn  being transferred to  for routine progression of patient care       Report consisted of patient's Situation, Background, Assessment and   Recommendations(SBAR). Information from the following report(s) ED Encounter Summary was reviewed with the receiving nurse. Lejunior Fall Assessment:    Presents to emergency department  because of falls (Syncope, seizure, or loss of consciousness): No  Age > 70: No  Altered Mental Status, Intoxication with alcohol or substance confusion (Disorientation, impaired judgment, poor safety awaremess, or inability to follow instructions): No  Impaired Mobility: Ambulates or transfers with assistive devices or assistance; Unable to ambulate or transer.: No             Lines:   Peripheral IV 09/20/23 Right Antecubital (Active)   Site Assessment Clean, dry & intact 09/20/23 1711   Line Status Blood return noted; Flushed 09/20/23 1711   Phlebitis Assessment No symptoms 09/20/23 1711   Infiltration Assessment 0 09/20/23 1711   Alcohol Cap Used No 09/20/23 1711   Dressing Status New dressing applied 09/20/23 1711   Dressing Type Transparent 09/20/23 1711   Dressing Intervention New 09/20/23 1711        Opportunity for questions and clarification was provided.       Patient transported with:  Chanda Sánchez RN  09/20/23 7886

## 2023-09-21 NOTE — INTERVAL H&P NOTE
Update History & Physical    The Patient's History and Physical of 9/20/2023 was reviewed with the patient and I examined the patient. There was no change. The surgical site was confirmed by the patient and me. Plan:  The risk, benefits, expected outcome, and alternative to the recommended procedure have been discussed with the patient. Patient understands and wants to proceed with open treatment of right femoral neck fracture with plate and screw fixation.     Electronically signed by Silviano Vail MD on 9/21/2023 at 7:34 AM

## 2023-09-21 NOTE — PROGRESS NOTES
TRANSFER - IN REPORT:    Verbal report received from SAMI Perez on [address]  being received from PACU for routine progression of patient care      Report consisted of patient's Situation, Background, Assessment and   Recommendations(SBAR). Information from the following report(s) Nurse Handoff Report was reviewed with the receiving nurse. Opportunity for questions and clarification was provided. Assessment will be completed upon patient's arrival to unit and care assumed.

## 2023-09-22 VITALS
RESPIRATION RATE: 18 BRPM | OXYGEN SATURATION: 99 % | HEART RATE: 62 BPM | SYSTOLIC BLOOD PRESSURE: 107 MMHG | DIASTOLIC BLOOD PRESSURE: 65 MMHG | HEIGHT: 70 IN | BODY MASS INDEX: 20.76 KG/M2 | WEIGHT: 145 LBS | TEMPERATURE: 98.4 F

## 2023-09-22 LAB
ANION GAP SERPL CALC-SCNC: 4 MMOL/L (ref 2–11)
BUN SERPL-MCNC: 9 MG/DL (ref 6–23)
CALCIUM SERPL-MCNC: 9.1 MG/DL (ref 8.3–10.4)
CHLORIDE SERPL-SCNC: 111 MMOL/L (ref 101–110)
CO2 SERPL-SCNC: 28 MMOL/L (ref 21–32)
CREAT SERPL-MCNC: 0.7 MG/DL (ref 0.8–1.5)
ERYTHROCYTE [DISTWIDTH] IN BLOOD BY AUTOMATED COUNT: 12.3 % (ref 11.9–14.6)
GLUCOSE SERPL-MCNC: 102 MG/DL (ref 65–100)
HCT VFR BLD AUTO: 40.2 % (ref 41.1–50.3)
HGB BLD-MCNC: 13.1 G/DL (ref 13.6–17.2)
MCH RBC QN AUTO: 30.3 PG (ref 26.1–32.9)
MCHC RBC AUTO-ENTMCNC: 32.6 G/DL (ref 31.4–35)
MCV RBC AUTO: 93.1 FL (ref 82–102)
NRBC # BLD: 0 K/UL (ref 0–0.2)
PLATELET # BLD AUTO: 348 K/UL (ref 150–450)
PMV BLD AUTO: 10.3 FL (ref 9.4–12.3)
POTASSIUM SERPL-SCNC: 4.3 MMOL/L (ref 3.5–5.1)
RBC # BLD AUTO: 4.32 M/UL (ref 4.23–5.6)
SODIUM SERPL-SCNC: 143 MMOL/L (ref 133–143)
WBC # BLD AUTO: 14 K/UL (ref 4.3–11.1)

## 2023-09-22 PROCEDURE — 97535 SELF CARE MNGMENT TRAINING: CPT

## 2023-09-22 PROCEDURE — 85027 COMPLETE CBC AUTOMATED: CPT

## 2023-09-22 PROCEDURE — 97161 PT EVAL LOW COMPLEX 20 MIN: CPT

## 2023-09-22 PROCEDURE — 6370000000 HC RX 637 (ALT 250 FOR IP): Performed by: ORTHOPAEDIC SURGERY

## 2023-09-22 PROCEDURE — 6360000002 HC RX W HCPCS: Performed by: ORTHOPAEDIC SURGERY

## 2023-09-22 PROCEDURE — 97165 OT EVAL LOW COMPLEX 30 MIN: CPT

## 2023-09-22 PROCEDURE — 80048 BASIC METABOLIC PNL TOTAL CA: CPT

## 2023-09-22 PROCEDURE — 97110 THERAPEUTIC EXERCISES: CPT

## 2023-09-22 PROCEDURE — 2580000003 HC RX 258: Performed by: ORTHOPAEDIC SURGERY

## 2023-09-22 PROCEDURE — 36415 COLL VENOUS BLD VENIPUNCTURE: CPT

## 2023-09-22 PROCEDURE — 97530 THERAPEUTIC ACTIVITIES: CPT

## 2023-09-22 PROCEDURE — 2580000003 HC RX 258: Performed by: STUDENT IN AN ORGANIZED HEALTH CARE EDUCATION/TRAINING PROGRAM

## 2023-09-22 RX ORDER — OXYCODONE HYDROCHLORIDE 10 MG/1
10 TABLET ORAL EVERY 6 HOURS PRN
Qty: 20 TABLET | Refills: 0 | Status: SHIPPED | OUTPATIENT
Start: 2023-09-22 | End: 2023-09-28 | Stop reason: SDUPTHER

## 2023-09-22 RX ORDER — NALOXONE HYDROCHLORIDE 2 MG/.4ML
2 INJECTION, SOLUTION INTRAMUSCULAR; SUBCUTANEOUS
Qty: 0.4 ML | Refills: 0 | Status: SHIPPED | OUTPATIENT
Start: 2023-09-22 | End: 2023-09-22

## 2023-09-22 RX ORDER — ASPIRIN 325 MG
325 TABLET, DELAYED RELEASE (ENTERIC COATED) ORAL DAILY
Qty: 30 TABLET | Refills: 0 | Status: SHIPPED | OUTPATIENT
Start: 2023-09-23 | End: 2023-10-23

## 2023-09-22 RX ADMIN — SODIUM CHLORIDE, PRESERVATIVE FREE 5 ML: 5 INJECTION INTRAVENOUS at 07:55

## 2023-09-22 RX ADMIN — ASPIRIN 325 MG: 325 TABLET, COATED ORAL at 07:52

## 2023-09-22 RX ADMIN — OXYCODONE HYDROCHLORIDE 10 MG: 5 TABLET ORAL at 07:51

## 2023-09-22 RX ADMIN — CEFAZOLIN SODIUM 2000 MG: 100 INJECTION, POWDER, LYOPHILIZED, FOR SOLUTION INTRAVENOUS at 04:23

## 2023-09-22 RX ADMIN — OXYCODONE HYDROCHLORIDE 10 MG: 5 TABLET ORAL at 13:21

## 2023-09-22 RX ADMIN — SODIUM CHLORIDE: 9 INJECTION, SOLUTION INTRAVENOUS at 05:44

## 2023-09-22 ASSESSMENT — PAIN SCALES - GENERAL
PAINLEVEL_OUTOF10: 7
PAINLEVEL_OUTOF10: 7
PAINLEVEL_OUTOF10: 0
PAINLEVEL_OUTOF10: 0
PAINLEVEL_OUTOF10: 6
PAINLEVEL_OUTOF10: 6

## 2023-09-22 ASSESSMENT — PAIN DESCRIPTION - ONSET
ONSET: GRADUAL
ONSET: GRADUAL

## 2023-09-22 ASSESSMENT — PAIN DESCRIPTION - LOCATION
LOCATION: HIP

## 2023-09-22 ASSESSMENT — PAIN DESCRIPTION - DESCRIPTORS
DESCRIPTORS: ACHING

## 2023-09-22 ASSESSMENT — PAIN DESCRIPTION - ORIENTATION
ORIENTATION: RIGHT

## 2023-09-22 ASSESSMENT — PAIN - FUNCTIONAL ASSESSMENT
PAIN_FUNCTIONAL_ASSESSMENT: PREVENTS OR INTERFERES SOME ACTIVE ACTIVITIES AND ADLS
PAIN_FUNCTIONAL_ASSESSMENT: PREVENTS OR INTERFERES SOME ACTIVE ACTIVITIES AND ADLS

## 2023-09-22 ASSESSMENT — PAIN DESCRIPTION - FREQUENCY
FREQUENCY: INTERMITTENT
FREQUENCY: INTERMITTENT

## 2023-09-22 ASSESSMENT — PAIN DESCRIPTION - PAIN TYPE
TYPE: SURGICAL PAIN
TYPE: SURGICAL PAIN

## 2023-09-22 NOTE — CARE COORDINATION
PTA, pt indep with his ADLs. Lives with family. PT/OT consulted and reccommended OP PT. Pt declined, stated he wanted to do his own research on OP therapy facilities. Declined [facility] recommendation, stated he would purchase one if he felt he needed one once he is home. Does not have a PCP, was agreeable to referral to [facility]. Informed him that he would require a referral from his PCP once he is established for OP therapy, reported understanding. On RA. Crutches at bedside. Neilourgianluca verified and able to afford home meds. Will arrange his own transportation at discharge. 1205-Discharge order is in. No additional d/c needs identified. Tx goals met.     09/22/23 1140   Service Assessment   Patient Orientation Alert and Oriented   Cognition Alert   History Provided By Patient   Primary Caregiver Self   Support Systems Parent; Family Members;Friends/Neighbors   PCP Verified by CM No  (Referral made to [facility] for PCP f/u)   Prior Functional Level Independent in ADLs/IADLs   Current Functional Level Independent in ADLs/IADLs   Can patient return to prior living arrangement Yes   Ability to make needs known: Good   Family able to assist with home care needs: Yes   Would you like for me to discuss the discharge plan with any other family members/significant others, and if so, who?  No   Financial Resources Other (Comment)  ([employer])   Community Resources None   Social/Functional History   Lives With Family   Type of [facility]  One level   Bathroom Shower/Tub Walk-in shower   Mark Casarez Help From [facility]   Ambulation Assistance Independent   Transfer Assistance Independent   Active  Yes   Mode of Transportation Car   Occupation Full time employment   Type of Occupation [employer]   Discharge [facility] Prior

## 2023-09-22 NOTE — PROGRESS NOTES
ACUTE OCCUPATIONAL THERAPY GOALS:   (Developed with and agreed upon by patient and/or caregiver.)  1. Patient will complete lower body bathing and dressing with MODIFIED INDEPENDENCE and adaptive equipment as needed. 2. Patient will complete toileting with INDEPENDENCE. 3. Patient will complete grooming ADL standing at sink with INDEPENDENCE.  4. Patient will tolerate 25 minutes of OT treatment with 1-2 rest breaks to increase activity tolerance for ADLs. 5. Patient will complete functional transfers with MODIFIED INDEPENDENCE and adaptive equipment as needed. 6. Patient will tolerate 10 minutes BUE exercises to increase strength for safe, functional transfers. Timeframe: 7 visits     OCCUPATIONAL THERAPY Initial Assessment and Daily Note       OT Visit Days: 1  Acknowledge Orders  Time  OT Charge Capture  Rehab Caseload Tracker      AMERICA SWENSON    Crista Marmolejo is a 33 y.o. male   PRIMARY DIAGNOSIS: Closed right hip fracture, initial encounter ([address])  Closed right hip fracture, initial encounter ([address]) [S72.001A]  Procedure(s) (LRB):  FEMUR IM NAIL DARREN INSERTION ANTEGRADE/ SYENTHES FNS (Right)  1 Day Post-Op  Reason for Referral: Pain in Right Hip (M25.551)  Stiffness of Right Hip, Not elsewhere classified (M25.651)  Generalized Muscle Weakness (M62.81)  Difficulty in walking, Not elsewhere classified (R26.2)  Other abnormalities of gait and mobility (R26.89)  Inpatient: Payor: Herbie Aguilar / Plan: ANURAG NAP CHOICE POS II / Product Type: *No Product type* /     ASSESSMENT:     REHAB RECOMMENDATIONS:   Recommendation to date pending progress:  Setting:  No further skilled occupational therapy after discharge from hospital    Equipment:     3 in 1 Bedside Commode  to maintain hip precautions     ASSESSMENT:  Mr. Sheila Hearn is a 34 y/o male presents with stress fracture in R hip from running 50+ miles/week, ended up being non-displaced femoral neck fracture. Pt is now s/p IM nail and is WBAT RLE.  At baseline Edgardo Turner / Alcira Singh / AIRWAY: IV    RESTRICTIONS/PRECAUTIONS:  Restrictions/Precautions: Weight Bearing  Right Lower Extremity Weight Bearing: Weight Bearing As Tolerated    PAIN: VITALS / O2:   Pre Treatment:   Pain Assessment: 0-10  Pain Level: 7  Pain Location: Hip  Pain Orientation: Right  Pain Descriptors: Aching  Non-Pharmaceutical Pain Intervention(s): Ambulation/Increased Activity      Post Treatment: no c/o pain, resting comfortably in bedside chair       Vitals          Oxygen            GROSS EVALUATION: INTACT IMPAIRED   (See Comments)   UE AROM [x] []   UE PROM [x] []   Strength [x]       Posture / Balance [] Posture: Good  Sitting - Static: Good  Sitting - Dynamic: Good  Standing - Static: Fair, +  Standing - Dynamic: Fair, +   Sensation [x]     Coordination [x]       Tone [x]       Edema [x]    Activity Tolerance [x]       Hand Dominance R [] L []      COGNITION/  PERCEPTION: INTACT IMPAIRED   (See Comments)   Orientation [x]     Vision [x]     Hearing [x]     Cognition  [x]     Perception [x]       MOBILITY: I Mod I S SBA CGA Min Mod Max Total  NT x2 Comments:   Bed Mobility    Rolling [] [] [] [] [] [] [] [] [] [x] []    Supine to Sit [] [] [] [x] [] [] [] [] [] [] [] Cues for technique   Scooting [] [] [] [x] [] [] [] [] [] [] []    Sit to Supine [] [] [] [] [] [] [] [] [] [x] []    Transfers    Sit to Stand [] [] [] [x] [] [] [] [] [] [] [] Crutches   Bed to Chair [] [] [] [x] [] [] [] [] [] [] [] Crutches   Stand to Sit [] [] [] [x] [] [] [] [] [] [] [] Crutches   Tub/Shower [] [] [] [] [] [] [] [] [] [x] []     Toilet [] [] [] [] [] [] [] [] [] [x] []      [] [] [] [] [] [] [] [] [] [x] []    I=Independent, Mod I=Modified Independent, S=Supervision/Setup, SBA=Standby Assistance, CGA=Contact Guard Assistance, Min=Minimal Assistance, Mod=Moderate Assistance, Max=Maximal Assistance, Total=Total Assistance, NT=Not Tested    ACTIVITIES OF DAILY LIVING: I Mod I S SBA CGA Min Mod Max Total NT Comments BASIC ADLs:              Upper Body Bathing  [] [] [] [] [] [] [] [] [] [x]     Lower Body Bathing [] [] [] [] [] [] [] [] [] [x]     Toileting [] [] [] [] [] [] [] [] [] [x]    Upper Body Dressing [] [] [] [] [] [] [] [] [] [x]    Lower Body Dressing [] [] [] [] [x] [x] [] [] [] [] Donning underwear and pants sitting/standing at sink, cues for technique and dynamic balance   Feeding [] [] [] [] [] [] [] [] [] [x]    Grooming [] [] [] [] [] [] [] [] [] [x]    Personal Device Care [] [] [] [] [] [] [] [] [] [x]    Functional Mobility [] [] [] [x] [] [] [] [] [] [] Crutches   I=Independent, Mod I=Modified Independent, S=Supervision/Setup, SBA=Standby Assistance, CGA=Contact Guard Assistance, Min=Minimal Assistance, Mod=Moderate Assistance, Max=Maximal Assistance, Total=Total Assistance, NT=Not Tested    PLAN:   FREQUENCY/DURATION   OT Plan of Care: 5 times/week for duration of hospital stay or until stated goals are met, whichever comes first.    PROBLEM LIST:   (Skilled intervention is medically necessary to address:)  Decreased ADL/Functional Activities  Decreased Balance  Decreased Transfer Abilities   INTERVENTIONS PLANNED:  (Benefits and precautions of occupational therapy have been discussed with the patient.)  Self Care Training  Therapeutic Activity  Therapeutic Exercise/HEP  Neuromuscular Re-education  Manual Therapy  Education         TREATMENT:     EVALUATION: LOW COMPLEXITY: (Untimed Charge)    TREATMENT:   Self Care (23 minutes): Patient participated in lower body dressing ADLs in unsupported sitting and standing with minimal verbal and tactile cueing to increase independence, decrease assistance required, increase activity tolerance, and maintain precautions. Patient also participated in functional mobility, functional transfer, and adaptive equipment training to increase independence, decrease assistance required, increase activity tolerance, increase safety awareness, and maintain precautions.

## 2023-09-22 NOTE — PROGRESS NOTES
Pt being discharged to home. Discharge instructions, medication side effects sheet, follow up appointment and prescriptions reviewed and explained to the patient. Patient verbalizes understanding of instructions. A copy of discharge instructions and prescriptions  have been given to patient. Opportunity for questions provided.

## 2023-09-22 NOTE — DISCHARGE INSTRUCTIONS
[facility]        Patient Discharge Instructions    Riley Cancer / [id number] : 1995    Admitted 2023 Discharged: 2023     IF YOU HAVE ANY PROBLEMS ONCE YOU ARE AT HOME CALL THE FOLLOWING NUMBERS:   Main office number: [phone] ask for Marlo Castle (medical assistant with Dr. Petrona Whalen)  Office Address: [address] [place] [address]  [address] [place] [address] [address]        Weight bearing status: As tolerated with walker and assistance        Diet  Resume regular diet  If experiencing nausea and vomiting, call your doctor. Medications    The medications you are to continue on are listed on the medication reconciliation sheet. Narcotic pain medications as well as supplemental iron can cause constipation. If this occurs try stopping the narcotic pain medication and/or the iron. It is important that you take the medication exactly as they are prescribed. Keep your medication in the bottles provided by the pharmacist and keep a list of the medication names, dosages, and times to be taken in your wallet. Do not take other medications without consulting your doctor. Other Important Information    Do NOT smoke as this will greatly increase your risk of infection! Do not drive and operate hazardous machinery until being cleared to do so by your doctor     Swelling and warmth is normal for 6 months after surgery. If you experience swelling in your leg elevate you leg while laying down with your toes above your heart. If you have sudden onset severe swelling with leg pain call our office. The stitches deep inside take approximately 6 months to dissolve. There will be sharp shooting, stinging and burning pain. This is normal and will resolve between 3-6 months after surgery. Difficulty sleeping is normal following surgery. You may try melatonin, an over-the-counter sleep aid or benadryl to help with sleep.  Most patients will resume sleeping through the night 5-8 weeks after surgery. Home Physical Therapy is arranged. Home Health will contact you within 48 hrs of discharge that you have chosen. If you have not received a call within this time frame please contact that provider you chose. You should be given this information before you leave the hospital.     You are at a risk for falls. Use the rolling walker when walking. Patients should not drive if they are still taking narcotic pain mediation during the daytime hours. Most patients wean themselves off of pain medication within 2-5 weeks after surgery. Please give a list of your current medications to your Primary Care Provider and update this list whenever your medications are discontinued, doses are changed, or new medications (including over-the-counter products) are added. Please carry medication information at all times in case of emergency situations. You may be retaining fluid if you have a history of heart failure or if you experience any of the following symptoms:  Weight gain of 3 pounds or more overnight or 5 pounds in a week, increased swelling in our hands or feet or shortness of breath while lying flat in bed. Please call your doctor as soon as you notice any of these symptoms; do not wait until your next office visit. If you have sleep apnea and have a CPAP machine, please use it for all naps and sleeping. When to Call the office    - If you have a temperature greater then 101  - Excessive bleeding that does not stop after holding pressure over the area  - Excessive redness, swelling or bruising, and/ or green or yellow, smelly discharge from incision  - Uncontrolled vomiting   - Loose control of your bladder or bowel function  - Need a pain medication refill   - Need to change your follow up appointment     Information obtained by :  I understand that if any problems occur once I am at home I am to contact my physician.     I understand and acknowledge receipt of the instructions indicated

## 2023-09-22 NOTE — PLAN OF CARE
Problem: Discharge Planning  Goal: Discharge to home or other facility with appropriate resources  9/22/2023 0733 by Quiana Nath RN  Outcome: Progressing  9/22/2023 0145 by Yasemin Wilks RN  Outcome: Progressing     Problem: Pain  Goal: Verbalizes/displays adequate comfort level or baseline comfort level  9/22/2023 0733 by Quiana Nath RN  Outcome: Progressing  9/22/2023 0145 by Yasemin Wilks RN  Outcome: Progressing     Problem: Safety - Adult  Goal: Free from fall injury  9/22/2023 0733 by Quiana Nath RN  Outcome: Progressing  9/22/2023 0145 by Yasemin Wilks RN  Outcome: Progressing

## 2023-09-28 ENCOUNTER — TELEPHONE (OUTPATIENT)
Dept: ORTHOPEDIC SURGERY | Age: 28
End: 2023-09-28

## 2023-09-28 DIAGNOSIS — S72.001A CLOSED RIGHT HIP FRACTURE, INITIAL ENCOUNTER (HCC): ICD-10-CM

## 2023-09-28 RX ORDER — OXYCODONE HYDROCHLORIDE 10 MG/1
10 TABLET ORAL EVERY 6 HOURS PRN
Qty: 12 TABLET | Refills: 0 | Status: SHIPPED | OUTPATIENT
Start: 2023-09-28 | End: 2023-10-01

## 2023-09-28 NOTE — TELEPHONE ENCOUNTER
He would like a call back to clarify about his bandage. He also needs a refill of his Oxycodone sent to the pharmacy on file.

## 2023-10-04 ENCOUNTER — OFFICE VISIT (OUTPATIENT)
Dept: ORTHOPEDIC SURGERY | Age: 28
End: 2023-10-04

## 2023-10-04 DIAGNOSIS — S72.001A CLOSED RIGHT HIP FRACTURE, INITIAL ENCOUNTER (HCC): Primary | ICD-10-CM

## 2023-10-04 PROCEDURE — 99024 POSTOP FOLLOW-UP VISIT: CPT | Performed by: PHYSICIAN ASSISTANT

## 2023-10-04 NOTE — PROGRESS NOTES
Physician Progress Note      PATIENT:               Zoila Runner  [facility] #:                  [id number]  :                       1995  ADMIT DATE:       2023 4:57 PM  [address] [facility] DATE:        2023 2:53 PM  RESPONDING  PROVIDER #:        Arnoldo Moore MD          QUERY TEXT:    Pt admitted with hip fracture. Pt noted to have osteoporotic hip per the Op   Note. If possible, please document in progress notes and discharge summary if   you are evaluating and/or treating any of the following: The medical record reflects the following:  Risk Factors: 33 YOM with hip pain s/p running. XR in the ED showed right   femoral fracture  Clinical Indicators: Per Op Note :  \"prepped and draped the osteoporotic    hip. \"  Treatment: Open treatment of right femoral neck fracture with plate and screw   fixation on . Options provided:  -- Pathological femur fracture  -- Osteoporotic femur fracture  -- Osteoporotic femur fracture following fall which would not usually break a   normal, healthy bone  -- Traumatic femur fracture  -- Other - I will add my own diagnosis  -- Disagree - Not applicable / Not valid  -- Disagree - Clinically unable to determine / Unknown  -- Refer to Clinical Documentation Reviewer    PROVIDER RESPONSE TEXT:    This patient has an osteoporotic femur fracture.     Query created by: China Mora on 10/4/2023 8:16 AM      Electronically signed by:  Arnoldo Moore MD 10/4/2023 3:30 PM

## 2023-10-04 NOTE — PROGRESS NOTES
[address] [place] [address]            Patient ID:  Name: Yuval Duong  AGE/Gender: 33 y.o. male  MRN: [id number]  : 1995    Date of Service: 10/4/2023          ALLERGIES: No Known Allergies       History:  The patient is seen today for follow-up. The patient sustained  a right Hip fracture and underwent ORIF at [facility].  The patient has been progressing with physical therapy. Patient is here today for wound check. They have no other complaints or concerns:      Physical Exam:       General:  On exam the patient is a pleasant 33 y.o. male in no acute distress, A&O x 3. Hip: This incision is healing there is swelling consistent with the postoperative time frame. There is no drainage. ROM not assessed. The calf is soft and non-tender. Assessment and Plan:   The incision is healing. I removed the staples and applied steri strips. OK to bathe. WBAT The patient was advised to notify us if drainage returns. We will follow up in 4 weeks or sooner if needed.        Electronically signed by:   AMANDA Vasquez  10/4/2023,  10:29 AM

## 2023-11-02 ENCOUNTER — OFFICE VISIT (OUTPATIENT)
Dept: ORTHOPEDIC SURGERY | Age: 28
End: 2023-11-02

## 2023-11-02 DIAGNOSIS — S72.001A CLOSED RIGHT HIP FRACTURE, INITIAL ENCOUNTER (HCC): Primary | ICD-10-CM

## 2023-11-02 NOTE — PROGRESS NOTES
Progress Note    Patient: Rafat Gil MRN: [id number]  SSN: [id number]    YOB: 1995  Age: 33 y.o. Sex: male        11/2/2023      Subjective:     Patient is here today now about 6 weeks out from a femoral neck fracture. This was basically a nondisplaced femoral neck fracture that was more of a basicervical type injury which was treated with femoral neck system fixation. He says he is doing very well clinically has almost no pain so he seems to be very pleased. 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 Physical Exam:     Skin - incision is well healed with no redness or drainage  Motor and sensory function intact in RIGHT LOWER extremity  Pulses palpable in RIGHT LOWER extremity     XRAY FINDINGS:  Xwrbedyrmsm-[id number]-yl right femoral neck fracture, findings-true AP and lateral views of the right hip shows the right femoral neck fracture shows significant evidence of healing and is very well aligned on both AP and lateral projection. There is no loss of fixation and the hardware is intact with no evidence of loosening or failure. Impression is healing very well aligned right femoral neck fracture    Assessment:     Healing right femoral neck fracture    Plan:     I think it would allow him to essentially be activity as tolerated. I have just caution him about doing any sort of running or cutting sports until his next visit I think any sort of walking though he wants to do as long as he is comfortable he can do this. He is already back at work.   I will see him back for another visit in about 6 weeks with true AP and lateral views of the right hip on return he will be about 3 months out at that time    Signed By: Ankush Chandra MD     November 2, 2023

## 2023-12-14 ENCOUNTER — OFFICE VISIT (OUTPATIENT)
Dept: ORTHOPEDIC SURGERY | Age: 28
End: 2023-12-14

## 2023-12-14 DIAGNOSIS — S72.001A CLOSED RIGHT HIP FRACTURE, INITIAL ENCOUNTER (HCC): Primary | ICD-10-CM

## 2023-12-14 PROCEDURE — 99024 POSTOP FOLLOW-UP VISIT: CPT | Performed by: ORTHOPAEDIC SURGERY

## 2023-12-14 NOTE — PROGRESS NOTES
Progress Note    Patient: Kristan Ruby MRN: [id number]  SSN: [id number]    YOB: 1995  Age: 33 y.o. Sex: male        12/14/2023      Subjective:     Patient is now about 3 months out plate-screw fixation of a right femoral neck fracture. He is doing okay just is still having a lot of pain in his hip when he is at work. He just feels like when he has to stand all day he has a lot of soreness and pain around his hip especially over the lateral aspect of his hip. Objective: There were no vitals filed for this visit. Physical Exam:     Skin - incision is well healed with no redness or drainage  Motor and sensory function intact in RIGHT LOWER extremity  Pulses palpable in RIGHT LOWER extremity     XRAY FINDINGS:  Obyogxgdtn-godcjz-ss right femoral neck fracture, findings-AP and lateral views of the right hip shows the right femoral neck fracture is well aligned and shows significant evidence of healing on both views. The hardware is intact with no evidence of loosening or failure. There is no evidence of any avascular necrosis or loss of the normal skin spherical appearance of his femoral head. Impression-healing well aligned right femoral neck fracture    Assessment:     Healing well aligned right femoral neck fracture    Plan:     I think it is reasonable for him to try some sedentary work only for a while just try to really back off on his activity level and start some physical therapy again as he been getting little more comfortable.   I will see him back in about 6 weeks with AP and lateral right hip on return he will be about 4 and half months out at that time    Signed By: Michelle Fletcher MD     December 14, 2023

## 2024-01-04 ENCOUNTER — HOSPITAL ENCOUNTER (OUTPATIENT)
Dept: PHYSICAL THERAPY | Age: 29
Setting detail: RECURRING SERIES
Discharge: HOME | End: 2024-01-07
Attending: ORTHOPAEDIC SURGERY
Payer: COMMERCIAL

## 2024-01-04 DIAGNOSIS — M62.81 MUSCLE WEAKNESS (GENERALIZED): ICD-10-CM

## 2024-01-04 DIAGNOSIS — M25.551 PAIN IN RIGHT HIP: Primary | ICD-10-CM

## 2024-01-04 PROCEDURE — 97161 PT EVAL LOW COMPLEX 20 MIN: CPT

## 2024-01-04 PROCEDURE — 97530 THERAPEUTIC ACTIVITIES: CPT

## 2024-01-04 NOTE — PROGRESS NOTES
today  Communication/Consultation: Therapy Evaluation sent to referring provider  Equipment provided: HEP  Recommendations/Intent for next treatment session: Next visit will focus on advancements to more challenging activities. Progress repetitions, weight, and complexity of functional movement per pt tolerance and as indicated.     >Total Treatment Billable Duration: 25 minutes  Time In: 1017  Time Out: 1100     Alexa Naidu PT, DPT    Charge Capture  [employer]  Appt Desk     Future Appointments   Date Time Provider Department Center   1/12/2024  3:15 PM Alexa Naidu PT [facility]   1/18/2024  1:45 PM Alexa Naidu PT [facility]   1/26/2024  3:15 PM Alexa Naidu PT [facility]   1/30/2024  9:00 AM Jonnathan Loyola MD [facility]   2/2/2024  3:15 PM Alexa Naidu PT [facility]

## 2024-01-05 ENCOUNTER — TELEPHONE (OUTPATIENT)
Dept: ORTHOPEDIC SURGERY | Age: 29
End: 2024-01-05

## 2024-01-12 ENCOUNTER — HOSPITAL ENCOUNTER (OUTPATIENT)
Dept: PHYSICAL THERAPY | Age: 29
Setting detail: RECURRING SERIES
Discharge: HOME | End: 2024-01-15
Attending: ORTHOPAEDIC SURGERY
Payer: COMMERCIAL

## 2024-01-12 PROCEDURE — 97110 THERAPEUTIC EXERCISES: CPT

## 2024-01-12 ASSESSMENT — PAIN SCALES - GENERAL: PAINLEVEL_OUTOF10: 0

## 2024-01-12 NOTE — PROGRESS NOTES
Cesario Medrano  : 1995  Primary: [employer] ([employer])  Secondary:  [facility] @ [place] [address] [place] [address]  Phone: [phone]  Fax: [phone] Plan Frequency: 1x/week for 4 weeks, then 1x every 2 weeks    Plan of Care/Certification Expiration Date: 24            Plan of Care/Certification Expiration Date:  Plan of Care/Certification Expiration Date: 24    Frequency/Duration:   Plan Frequency: 1x/week for 4 weeks, then 1x every 2 weeks      Time In/Out:   Time In: 1515  Time Out: 1557      PT Visit Info:    Plan Frequency: 1x/week for 4 weeks, then 1x every 2 weeks  Total # of Visits Approved: 60  Progress Note Due Date: 24 (or 10th visit)  Total # of Visits to Date: 2  Progress Note Counter: 2      Visit Count:  2    OUTPATIENT PHYSICAL THERAPY:   Treatment Note 2024       Charge Capture        Episode  (R hip pain s/p R femoral fracture repair on 23)               Treatment Diagnosis:    Pain in right hip  Muscle weakness (generalized)    Medical/Referring Diagnosis:   Closed right hip fracture, initial encounter ([facility])   Referring Physician: Jonnathan Loyola MD MD Orders: PT Eval and Treat   Return MD Appt:  24  Date of Onset: Onset Date:  (roughly 23)    Allergies: Patient has no known allergies.    Restrictions/Precautions: None    Interventions Planned (Treatment may consist of any combination of the following): See Assessment Note      Subjective Comments: overall he has had a dec in pain. Has been consistent with his HEP (5x/wk). Exercises have been getting easier. Has been getting in up to 40,000 steps per day at work.    Initial Pain Level:     0/10   Post Session Pain Level:       0/10     Medications Last Reviewed: 2024    Updated Objective Findings: None Today      Treatment     GAIT TRAINING: (0 minutes): Gait training to improve and/or restore physical functioning as

## 2024-01-18 ENCOUNTER — HOSPITAL ENCOUNTER (OUTPATIENT)
Dept: PHYSICAL THERAPY | Age: 29
Setting detail: RECURRING SERIES
Discharge: HOME | End: 2024-01-21
Attending: ORTHOPAEDIC SURGERY
Payer: COMMERCIAL

## 2024-01-18 PROCEDURE — 97530 THERAPEUTIC ACTIVITIES: CPT

## 2024-01-18 ASSESSMENT — PAIN SCALES - GENERAL: PAINLEVEL_OUTOF10: 0

## 2024-01-18 NOTE — PROGRESS NOTES
Cesario Medrano  : 1995  Primary: [employer] ([employer])  Secondary:  [facility] @ [place] [address] [place]  [facility] [address]  Phone: [phone]  Fax: [phone] Plan Frequency: 1x/week for 4 weeks, then 1x every 2 weeks    Plan of Care/Certification Expiration Date: 24            Plan of Care/Certification Expiration Date:  Plan of Care/Certification Expiration Date: 24    Frequency/Duration:   Plan Frequency: 1x/week for 4 weeks, then 1x every 2 weeks      Time In/Out:   Time In: 1345  Time Out: 1429      PT Visit Info:    Plan Frequency: 1x/week for 4 weeks, then 1x every 2 weeks  Total # of Visits Approved: 60  Progress Note Due Date: 24 (or 10th visit)  Total # of Visits to Date: 3  Progress Note Counter: 3      Visit Count:  3    OUTPATIENT PHYSICAL THERAPY:   Treatment Note 2024       Charge Capture        Episode  (R hip pain s/p R femoral fracture repair on 23)               Treatment Diagnosis:    Pain in right hip  Muscle weakness (generalized)    Medical/Referring Diagnosis:   Closed right hip fracture, initial encounter ([facility])   Referring Physician: Jonnathan Loyola MD MD Orders: PT Eval and Treat   Return MD Appt:  24  Date of Onset: Onset Date:  (roughly 23)    Allergies: Patient has no known allergies.    Restrictions/Precautions: None    Interventions Planned (Treatment may consist of any combination of the following): See Assessment Note      Subjective Comments: has continued with his activities, was a little sore the day after his last visit but not terribly so.    Initial Pain Level:     0/10   Post Session Pain Level:        (none noted)/10     Medications Last Reviewed: 2024    Updated Objective Findings: None Today      Treatment     GAIT TRAINING: (0 minutes): Gait training to improve and/or restore physical functioning as related to mobility, strength, balance and coordination.

## 2024-01-26 ENCOUNTER — HOSPITAL ENCOUNTER (OUTPATIENT)
Dept: PHYSICAL THERAPY | Age: 29
Setting detail: RECURRING SERIES
End: 2024-01-26
Attending: ORTHOPAEDIC SURGERY
Payer: COMMERCIAL

## 2024-01-26 NOTE — PROGRESS NOTES
Cesario Medrano  : 1995  Primary: [employer]  Secondary:  [facility] @ [place] [address] [address]  Phone: [phone]  Fax: [phone] Plan Frequency: 1x/week for 4 weeks, then 1x every 2 weeks    Plan of Care/Certification Expiration Date: 24      PT Visit Info:  Total # of Visits Approved: 60  Total # of Visits to Date: 3  Progress Note Counter: 3  Progress Note Due Date: 24 (or 10th visit)  Canceled Appointment: 1         OUTPATIENT PHYSICAL THERAPY 2024     Appt Desk   Episode   MyChart      Mr. Medrano was a same-day cancellation for today's appointment 2/2 lack of .     Cancellation Number: 1       Alexa Naidu, PT, DPT    Future Appointments   Date Time Provider Department Center   2024  9:00 AM Jonnathan Loyola MD [facility]   2024  3:15 PM Alexa Naidu, PT [facility]

## 2024-02-01 ENCOUNTER — OFFICE VISIT (OUTPATIENT)
Dept: ORTHOPEDIC SURGERY | Age: 29
End: 2024-02-01

## 2024-02-01 DIAGNOSIS — S72.001A CLOSED RIGHT HIP FRACTURE, INITIAL ENCOUNTER (HCC): Primary | ICD-10-CM

## 2024-02-01 PROCEDURE — 99024 POSTOP FOLLOW-UP VISIT: CPT | Performed by: ORTHOPAEDIC SURGERY

## 2024-02-01 NOTE — PROGRESS NOTES
Progress Note    Patient: Cesario Medrano MRN: [id number]  SSN: [id number]    YOB: 1995  Age: 28 y.o.  Sex: male        2/1/2024      Subjective:     Patient is here today in follow-up from a right femoral neck fracture was treated with plate and screw fixation.  He is about 4-1/2 months out now.  He says he has some occasional issues with sort of gets a little more painful and it has been bothering little bit more over the last week but before that he was doing really well he has very little symptoms with that he is back at work.    Objective:     There were no vitals filed for this visit.       Physical Exam:     Skin - incision is well healed with no redness or drainage  Motor and sensory function intact in RIGHT LOWER extremity  Pulses palpable in RIGHT LOWER extremity     XRAY FINDINGS:  Indications follow-up right femoral neck fracture, findings-true AP and lateral views of the right hip shows the right femoral neck system plate and screw construct is very well-positioned and shows no evidence of failure or migration.  The fracture itself appears to have healed and healed no very good position.  The femoral head itself shows no evidence of any collapse or loss of its normal spherical shape.  I see no evidence of any avascular necrosis.  He has no degenerative changes.  Impression # healed well aligned right femoral neck fracture    Assessment:     Healed well aligned right femoral neck fracture    Plan:     I think he can essentially be activity as tolerated.  I am not sure there is much value in continuing to watch this.  I have just make sure he understands that if he has any issues where this is becoming more painful I think it be very important to either see me or someone like myself.  He thinks he may be moving to [place] so I have given him the name of one of the orthopedic trauma surgeons there that I think would be willing to see him.  I think otherwise he could probably

## 2024-10-19 NOTE — THERAPY EVALUATION
perform pain-free ADLs/IADLs and to improve overall quality of life prior to discharge.     Therapy Problem List: (Impacting functional limitations):    Increased Pain, Decreased Strength, Decreased ROM, Decreased Functional Mobility, Decreased Posture, Decreased Body Mechanics, and Decreased Activity Tolerance/Endurance*     Therapy Prognosis: Good      Initial Assessment Complexity: Low Complexity      PLAN   Effective Dates: 1/4/24 TO Plan of Care/Certification Expiration Date: 04/03/24     Frequency/Duration: Plan Frequency: 1x/week for 4 weeks, then 1x every 2 weeks     Interventions Planned: (Treatment may consist of any combination of the following):    Current Treatment Recommendations: Strengthening; ROM; Balance training; Functional mobility training; Endurance training; Neuromuscular re-education; Manual; Home exercise program; Modalities; Therapeutic activities       GOALS: (Goals have been discussed and agreed upon with patient.)  Short-Term Functional Goals: Time Frame: 30 days  Patient will demonstrate understanding of a home exercise program independently.  Patient will be able to walk for 30 minutes without reproduction of right hip pain, demonstrating improvement in sx reactivity as he progresses within therapy.  Patient will be able to jog for at least 10 minutes with right hip pain < 3/10, for progression towards his PLOF.  Patient will demonstrate appropriate body mechanics with goblet squat, with no reports of right hip pain or tightness when performing, for improvement in ability to perform work related tasks without flare up occurring.       Discharge Goals: Time Frame: 90 days    Patient will be able to jog for at least 30 minutes without reproduction of right hip pain as he continues to progress towards his PLOF.   Patient will increase his score on the PT questionnaires: LEFS by at least 10 points from his 64/80 initial score to demonstrate gains in function.  Patient will improve MMT BLE  yes

## (undated) DEVICE — SUTURE MCRYL SZ 2-0 L27IN ABSRB UD SH L26MM TAPERPOINT NDL Y417H

## (undated) DEVICE — GLOVE ORANGE PI 7 1/2   MSG9075

## (undated) DEVICE — TFN: Brand: MEDLINE INDUSTRIES, INC.

## (undated) DEVICE — GUIDEWIRE ORTH L400MM DIA3.2MM FOR TFN

## (undated) DEVICE — SOLUTION IRRIG 1000ML 0.9% SOD CHL USP POUR PLAS BTL

## (undated) DEVICE — 7 DAY SILVER-COATED ANTIMICROBIAL BARRIER DRESSING: Brand: ACTICOAT 7  4" X 5"

## (undated) DEVICE — SPONGE GZ W4XL4IN COT 12 PLY TYP VII WVN C FLD DSGN STERILE

## (undated) DEVICE — INTENDED FOR TISSUE SEPARATION, AND OTHER PROCEDURES THAT REQUIRE A SHARP SURGICAL BLADE TO PUNCTURE OR CUT.: Brand: BARD-PARKER ® STAINLESS STEEL BLADES

## (undated) DEVICE — C-ARM: Brand: UNBRANDED

## (undated) DEVICE — BIT DRL L413MM DIA4.3MM FOR FEM NK SYSTEN

## (undated) DEVICE — BANDAGE COBAN 4 IN COMPR W4INXL5YD FOAM COHESIVE QUIK STK SELF ADH SFT

## (undated) DEVICE — GLOVE SURG SZ 8 L12IN FNGR THK79MIL GRN LTX FREE